# Patient Record
Sex: FEMALE | URBAN - METROPOLITAN AREA
[De-identification: names, ages, dates, MRNs, and addresses within clinical notes are randomized per-mention and may not be internally consistent; named-entity substitution may affect disease eponyms.]

---

## 2017-02-13 ENCOUNTER — IMPORTED ENCOUNTER (OUTPATIENT)
Dept: URBAN - METROPOLITAN AREA CLINIC 1 | Facility: CLINIC | Age: 67
End: 2017-02-13

## 2017-02-13 PROBLEM — H40.023: Noted: 2017-02-13

## 2017-02-13 PROBLEM — H25.813: Noted: 2017-02-13

## 2017-02-13 PROBLEM — H10.45: Noted: 2017-02-13

## 2017-02-13 PROBLEM — H40.053: Noted: 2017-02-13

## 2017-02-13 PROCEDURE — 92012 INTRM OPH EXAM EST PATIENT: CPT

## 2017-02-13 NOTE — PATIENT DISCUSSION
1.  Allergic Conjunctivitis OU : Begin Zaditor BID OU. Begin Lotemax BID OU. (samples given) D/c Visine allergy. D/c Tobramycin gtts. D/c Neomycin gtts. Begin PF ATs TID OU routinely (Sample of PF Optive given)  2. Glaucoma Suspect/ OHTN OU : (CD 0.75/0.7) Steroid Responder. IOP is elevated today. Begin Combigan BID OU (Sample given) Patient is considered high risk. Compliance with f/u's discussed with patient today. Risk of permanent blindness discussed. 3.  Cataract OU: Observe for now without intervention. The patient was advised to contact us if any change or worsening of visionReturn for an appointment in 2 week 10 OCT with Dr. Kary Vazquez.

## 2017-02-27 ENCOUNTER — IMPORTED ENCOUNTER (OUTPATIENT)
Dept: URBAN - METROPOLITAN AREA CLINIC 1 | Facility: CLINIC | Age: 67
End: 2017-02-27

## 2017-02-27 PROBLEM — H40.023: Noted: 2017-02-27

## 2017-02-27 PROBLEM — H16.143: Noted: 2017-02-27

## 2017-02-27 PROBLEM — H04.123: Noted: 2017-02-27

## 2017-02-27 PROBLEM — H10.45: Noted: 2017-02-27

## 2017-02-27 PROBLEM — H25.813: Noted: 2017-02-27

## 2017-02-27 PROBLEM — H40.053: Noted: 2017-02-27

## 2017-02-27 PROCEDURE — 92133 CPTRZD OPH DX IMG PST SGM ON: CPT

## 2017-02-27 PROCEDURE — 92012 INTRM OPH EXAM EST PATIENT: CPT

## 2017-02-27 NOTE — PATIENT DISCUSSION
1.  Allergic Conjunctivitis OU- Improving. Recommend using Lotemax OU QD until gone. Pt to continue Zaditor OU BID. The condition was  discussed with the patient. Avoidance of allergens and cool compresses were recommended. 2. SHERON w/ decreased PEK OU- Improving. The continuation of artificial tears were recommended. 3.  Cataract OU: Observe for now without intervention. The patient was advised to contact us if any change or worsening of vision4. Glaucoma Suspect/OHTN OU (CD 0.75/0.7) Steroid Responder. IOP improved on Combigan. Normal OCT OU. Continue Combigan OU BID. Observe for changes/progression. 5.  Return for an appointment for a 30 in 6 months with Dr. Neha Silveira.

## 2017-06-19 ENCOUNTER — IMPORTED ENCOUNTER (OUTPATIENT)
Dept: URBAN - METROPOLITAN AREA CLINIC 1 | Facility: CLINIC | Age: 67
End: 2017-06-19

## 2017-06-19 PROBLEM — H10.45: Noted: 2017-06-19

## 2017-06-19 PROBLEM — H16.143: Noted: 2017-06-19

## 2017-06-19 PROBLEM — H04.123: Noted: 2017-06-19

## 2017-06-19 PROBLEM — H25.813: Noted: 2017-06-19

## 2017-06-19 PROBLEM — H40.023: Noted: 2017-06-19

## 2017-06-19 PROCEDURE — 92012 INTRM OPH EXAM EST PATIENT: CPT

## 2017-06-19 NOTE — PATIENT DISCUSSION
1.  Glaucoma Suspect/OHTN OU (CD 0.75/0.7) Steroid Responder. H/o Combigan allergy. IOP stable on Latanoprost. Cont Latanoprost QHS OU. 2.  Cataract OU:  Observe. Consider Phaco next visit. 3.  SHERON w/ PEK OU- Cont ATs TID OU Routinely. 4.  Allergic Conjunctivitis OU - Use Zaditor BID OU PRN. Return for an appointment in Jan 30 glare (Consider Phaco) with Dr. Maira Borges.

## 2018-01-22 ENCOUNTER — IMPORTED ENCOUNTER (OUTPATIENT)
Dept: URBAN - METROPOLITAN AREA CLINIC 1 | Facility: CLINIC | Age: 68
End: 2018-01-22

## 2018-01-22 PROBLEM — H16.143: Noted: 2018-01-22

## 2018-01-22 PROBLEM — H25.813: Noted: 2018-01-22

## 2018-01-22 PROBLEM — H40.053: Noted: 2018-01-22

## 2018-01-22 PROBLEM — H04.123: Noted: 2018-01-22

## 2018-01-22 PROCEDURE — 92014 COMPRE OPH EXAM EST PT 1/>: CPT

## 2018-01-22 NOTE — PATIENT DISCUSSION
1.  OHTN OU- (.8 OD/.75 OS) IOP stable on Latanoprost OU QHS. (Steroid Responder. H/o Combigan allergy.) 2. SHERON w/ PEK OU-The continuation of artificial tears were recommended. 3.  Cataract OU:  Visually Significant secondary to glare discussed the risks benefits alternatives and limitations of cataract surgery. The patient stated a full understanding and a desire to proceed with the procedure. The patient will need to return for preop appointment with cataract measurements and to have any additional questions answered and start pre-operative eye drops as directed. Phaco PCL OD then OS ( consider goal of mid range patient is a )Otherwise follow-up 6mo/10 OCT4. Return for an appointment for Ascan/HP with Dr. Alex Cerda.

## 2018-02-26 ENCOUNTER — IMPORTED ENCOUNTER (OUTPATIENT)
Dept: URBAN - METROPOLITAN AREA CLINIC 1 | Facility: CLINIC | Age: 68
End: 2018-02-26

## 2018-02-26 PROBLEM — H25.811: Noted: 2018-02-26

## 2018-02-26 PROCEDURE — 92136 OPHTHALMIC BIOMETRY: CPT

## 2018-02-26 NOTE — PATIENT DISCUSSION
Cataract OD: Visually Significant secondary to glare discussed the risks benefits alternatives and limitations of cataract surgery. The patient stated a full understanding and a desire to proceed with the procedure. The patient will need to start pre-operative eye drops as directed. **Pt prefers mid-range to distanceProceed w/ phaco PCL ODPt understands they will need glasses post-op to achieve their best corrected vision. Return for an appointment for sx OD with Dr. Jose Alfred.

## 2018-03-14 ENCOUNTER — IMPORTED ENCOUNTER (OUTPATIENT)
Dept: URBAN - METROPOLITAN AREA CLINIC 1 | Facility: CLINIC | Age: 68
End: 2018-03-14

## 2018-03-15 ENCOUNTER — IMPORTED ENCOUNTER (OUTPATIENT)
Dept: URBAN - METROPOLITAN AREA CLINIC 1 | Facility: CLINIC | Age: 68
End: 2018-03-15

## 2018-03-15 PROBLEM — Z96.1: Noted: 2018-03-15

## 2018-03-15 PROCEDURE — 99024 POSTOP FOLLOW-UP VISIT: CPT

## 2018-03-15 NOTE — PATIENT DISCUSSION
POD#1 CE/IOL  Standard OD doing well. Continue all 3 gtts as prescribed and until gone. Ocuflox TIDLotemax BIDProlensa QDPost op Warnings Reiterated RTC as scheduled

## 2018-03-21 ENCOUNTER — IMPORTED ENCOUNTER (OUTPATIENT)
Dept: URBAN - METROPOLITAN AREA CLINIC 1 | Facility: CLINIC | Age: 68
End: 2018-03-21

## 2018-03-21 PROBLEM — H25.812: Noted: 2018-03-21

## 2018-03-21 PROCEDURE — 92136 OPHTHALMIC BIOMETRY: CPT

## 2018-03-28 ENCOUNTER — IMPORTED ENCOUNTER (OUTPATIENT)
Dept: URBAN - METROPOLITAN AREA CLINIC 1 | Facility: CLINIC | Age: 68
End: 2018-03-28

## 2018-03-29 ENCOUNTER — IMPORTED ENCOUNTER (OUTPATIENT)
Dept: URBAN - METROPOLITAN AREA CLINIC 1 | Facility: CLINIC | Age: 68
End: 2018-03-29

## 2018-03-29 PROBLEM — Z96.1: Noted: 2018-03-29

## 2018-03-29 PROCEDURE — 99024 POSTOP FOLLOW-UP VISIT: CPT

## 2018-03-29 NOTE — PATIENT DISCUSSION
1. POD#1 CE/IOL OS (Standard) doing well. Continue all 3 gtts as prescribed and until gone. Use Lotemax BID OS Prolensa Qdaily OS Ocuflox TID OS Use Latanoprost QHS OU1 gtt Combigan applied OS 2. POW#2  CE/IOL OD (Standard) doing well.    Use Lotemax BID OD till out Use Prolensa Qdaily OD till out F/u as scheduled

## 2018-04-23 ENCOUNTER — IMPORTED ENCOUNTER (OUTPATIENT)
Dept: URBAN - METROPOLITAN AREA CLINIC 1 | Facility: CLINIC | Age: 68
End: 2018-04-23

## 2018-04-23 PROBLEM — Z96.1: Noted: 2018-04-23

## 2018-04-23 PROCEDURE — 99024 POSTOP FOLLOW-UP VISIT: CPT

## 2018-04-23 NOTE — PATIENT DISCUSSION
POW#3 Phaco/ PCL OU (Standard OU) doing well. Finish PO meds per schedule MRX for glasses given Cont Latanoprost QHS OU. Return for an appointment in 6 mo 30 OCT with Dr. Amilcar He.

## 2019-02-11 ENCOUNTER — IMPORTED ENCOUNTER (OUTPATIENT)
Dept: URBAN - METROPOLITAN AREA CLINIC 1 | Facility: CLINIC | Age: 69
End: 2019-02-11

## 2019-02-11 PROBLEM — H04.123: Noted: 2019-02-11

## 2019-02-11 PROBLEM — H10.45: Noted: 2019-02-11

## 2019-02-11 PROBLEM — H40.053: Noted: 2019-02-11

## 2019-02-11 PROBLEM — Z96.1: Noted: 2019-02-11

## 2019-02-11 PROBLEM — H40.023: Noted: 2019-02-11

## 2019-02-11 PROBLEM — H26.493: Noted: 2019-02-11

## 2019-02-11 PROBLEM — H16.143: Noted: 2019-02-11

## 2019-02-11 PROCEDURE — 92133 CPTRZD OPH DX IMG PST SGM ON: CPT

## 2019-02-11 PROCEDURE — 92014 COMPRE OPH EXAM EST PT 1/>: CPT

## 2019-02-11 NOTE — PATIENT DISCUSSION
1.  Glaucoma Suspect OU/OHTN (CD 0.80/0.75) : Steroid Responder. Slight relative progression but OCT remains WNL OU. Cont Latanoprost QHS OU (erx). Patient is considered high risk. Condition was discussed with patient and patient understands. Will continue to monitor patient for any progression in condition. Patient was advised to call us with any problems questions or concerns. 2.  SHERON w/ PEK OU-The use/continuation of artificial tears were recommended. 3.  PCO OU: (Posterior Capsule Opacification)   Observe and consider yag cap when pt feels pco visually significant and visual acuity decreases to appropriate level. 4. Pseudophakia OU - (Standard OU) 5. Allergic Conjunctivitis OU :  Condition discussed with patient. Advised patient to use OTC Zaditor one drop OU BID prn. Return for an appointment in 6 months 10 with Dr. Cheo Smiley.

## 2019-08-26 ENCOUNTER — IMPORTED ENCOUNTER (OUTPATIENT)
Dept: URBAN - METROPOLITAN AREA CLINIC 1 | Facility: CLINIC | Age: 69
End: 2019-08-26

## 2019-08-26 PROBLEM — H26.493: Noted: 2019-08-26

## 2019-08-26 PROBLEM — H16.143: Noted: 2019-08-26

## 2019-08-26 PROBLEM — H40.053: Noted: 2019-08-26

## 2019-08-26 PROBLEM — H40.023: Noted: 2019-08-26

## 2019-08-26 PROBLEM — Z96.1: Noted: 2019-08-26

## 2019-08-26 PROBLEM — H10.45: Noted: 2019-08-26

## 2019-08-26 PROBLEM — H04.123: Noted: 2019-08-26

## 2019-08-26 PROCEDURE — 99213 OFFICE O/P EST LOW 20 MIN: CPT

## 2019-08-26 NOTE — PATIENT DISCUSSION
1.  Glaucoma Suspect OU/OHTN (CD 0.80/0.75): IOP stable cont Latanoprost QHS OU. Steroid Responder. Patient is considered high risk. 2.  SHERON w/ PEK OU- Recommend ATs TID OU routinely 3. PCO OU: (Posterior Capsule Opacification)   Observe and consider yag cap when pt feels pco visually significant and visual acuity decreases to appropriate level. 4. Pseudophakia OU - (Standard OU) 5. Allergic Conjunctivitis OU -- The condition was  discussed with the patient. Avoidance of allergens and cool compresses were recommended. Return for an appointment in 6 months 30/glare with Dr. Mami Lassiter.

## 2020-09-21 ENCOUNTER — IMPORTED ENCOUNTER (OUTPATIENT)
Dept: URBAN - METROPOLITAN AREA CLINIC 1 | Facility: CLINIC | Age: 70
End: 2020-09-21

## 2020-09-21 PROBLEM — H16.143: Noted: 2020-09-21

## 2020-09-21 PROBLEM — H40.023: Noted: 2020-09-21

## 2020-09-21 PROBLEM — H26.493: Noted: 2020-09-21

## 2020-09-21 PROBLEM — H04.123: Noted: 2020-09-21

## 2020-09-21 PROBLEM — H40.053: Noted: 2020-09-21

## 2020-09-21 PROCEDURE — 92133 CPTRZD OPH DX IMG PST SGM ON: CPT

## 2020-09-21 PROCEDURE — 92014 COMPRE OPH EXAM EST PT 1/>: CPT

## 2020-09-21 PROCEDURE — 92015 DETERMINE REFRACTIVE STATE: CPT

## 2020-09-21 NOTE — PATIENT DISCUSSION
1.  Glaucoma Suspect OU/OHTN -- (CD 0.80/0.75) IOP elevated today secondary to non-compliance (pt off gtts x 4 days). OCT today showing relative decrease RNFL OU. Restart Latanoprost QHS OU. Steroid Responder. Patient is considered high risk. 2. PCO OU -- Visually Significant *secondary to glare*; schedule YAG Cap OD then OS. Pros cons risks and benefits. 3.  SHERON w/ PEK OU -- Increase ATs QID OU routinely. 4.  Pseudophakia OU -- (Standard OU). 5.  Allergic Conjunctivitis OU -- The condition was discussed with the patient. Avoidance of allergens and cool compresses were recommended. Return for an appointment in YAG Cap OD then OS ([facility]) with Dr. Jerzy Eagle.

## 2020-09-30 ENCOUNTER — IMPORTED ENCOUNTER (OUTPATIENT)
Dept: URBAN - METROPOLITAN AREA CLINIC 1 | Facility: CLINIC | Age: 70
End: 2020-09-30

## 2020-10-14 ENCOUNTER — IMPORTED ENCOUNTER (OUTPATIENT)
Dept: URBAN - METROPOLITAN AREA CLINIC 1 | Facility: CLINIC | Age: 70
End: 2020-10-14

## 2020-11-05 ENCOUNTER — IMPORTED ENCOUNTER (OUTPATIENT)
Dept: URBAN - METROPOLITAN AREA CLINIC 1 | Facility: CLINIC | Age: 70
End: 2020-11-05

## 2020-11-05 PROCEDURE — 99024 POSTOP FOLLOW-UP VISIT: CPT

## 2020-11-05 NOTE — PATIENT DISCUSSION
PO YAG Cap OU: good result. MRX given. Continue Latanoprost QHS OUReturn for an appointment in April 10 with Dr. Amilcar He.

## 2021-06-10 ENCOUNTER — IMPORTED ENCOUNTER (OUTPATIENT)
Dept: URBAN - METROPOLITAN AREA CLINIC 1 | Facility: CLINIC | Age: 71
End: 2021-06-10

## 2021-06-10 PROBLEM — H40.023: Noted: 2021-06-10

## 2021-06-10 PROBLEM — H40.053: Noted: 2021-06-10

## 2021-06-10 PROCEDURE — 92012 INTRM OPH EXAM EST PATIENT: CPT

## 2021-06-10 NOTE — PATIENT DISCUSSION
1.  Glaucoma Suspect OU : IOP stable continue Latanoprost QHS OU. Steroid Responder. Patient is considered high risk. Condition was discussed with patient and patient understands. Will continue to monitor patient for any progression in condition. Patient was advised to call us with any problems questions or concerns. 2.  SHERON w/ PEK OU - Recommend increase ATs QID OU routinely. 3.  Pseudophakia OU - (Standard OU). 4.  Allergic Conjunctivitis OU - The condition was discussed with the patient. Avoidance of allergens and cool compresses were recommended. Return for an appointment in 6 months 30/OCT with Dr. Wilman Adam.

## 2021-06-10 NOTE — PATIENT DISCUSSION
Latanoprost QHS OU. Steroid Responder. Patient is considered high risk. 2. PCO OU -- Visually Significant *secondary to glare*; schedule YAG Cap OD then OS. Pros cons risks and benefits. 3.  SHERON w/ PEK OU -- Increase ATs QID OU routinely. 4.  Pseudophakia OU -- (Standard OU). 5.  Allergic Conjunctivitis OU -- The condition was discussed with the patient. Avoidance of allergens and cool compresses were recommended.

## 2021-12-13 ENCOUNTER — IMPORTED ENCOUNTER (OUTPATIENT)
Dept: URBAN - METROPOLITAN AREA CLINIC 1 | Facility: CLINIC | Age: 71
End: 2021-12-13

## 2021-12-13 PROBLEM — H40.023: Noted: 2021-12-13

## 2021-12-13 PROCEDURE — 92133 CPTRZD OPH DX IMG PST SGM ON: CPT

## 2021-12-13 PROCEDURE — 99214 OFFICE O/P EST MOD 30 MIN: CPT

## 2021-12-13 PROCEDURE — 92015 DETERMINE REFRACTIVE STATE: CPT

## 2021-12-13 NOTE — PATIENT DISCUSSION
1.  Glaucoma Suspect OU/OHTN -- () Steroid Responder. T-MAX: 30/28. IOP stable on Latanoprost. OCT today remains without progression. Continue Latanoprost QHS OU. 2.  SHERON w/ PEK OU -- Recommend continue ATs QID OU routinely. 3.  Pseudophakia OU -- (Standard OU). H/o YAG Cap OU. 4.  Allergic Conjunctivitis OU -- The condition was discussed with the patient. Avoidance of allergens and cool compresses were recommended. Finalized glasses MRx today. Return for an appointment in 6 months 10 with Dr. Wilman Adam.

## 2022-04-02 ASSESSMENT — TONOMETRY
OS_IOP_MMHG: 16
OD_IOP_MMHG: 30
OS_IOP_MMHG: 17
OS_IOP_MMHG: 22
OS_IOP_MMHG: 17
OD_IOP_MMHG: 21
OD_IOP_MMHG: 14
OS_IOP_MMHG: 24
OD_IOP_MMHG: 17
OD_IOP_MMHG: 18
OS_IOP_MMHG: 17
OD_IOP_MMHG: 19
OD_IOP_MMHG: 16
OS_IOP_MMHG: 16
OD_IOP_MMHG: 16
OS_IOP_MMHG: 15
OD_IOP_MMHG: 16
OS_IOP_MMHG: 16
OD_IOP_MMHG: 13
OS_IOP_MMHG: 28
OS_IOP_MMHG: 18
OS_IOP_MMHG: 13
OD_IOP_MMHG: 16
OD_IOP_MMHG: 16
OD_IOP_MMHG: 18
OS_IOP_MMHG: 14
OD_IOP_MMHG: 15

## 2022-04-02 ASSESSMENT — VISUAL ACUITY
OD_GLARE: 20/400
OD_SC: 20/25
OD_SC: 20/20
OD_CC: 20/30
OD_CC: 20/20-2
OS_CC: 20/50
OS_CC: 20/50
OD_CC: 20/20-2
OS_SC: J1
OD_CC: 20/30
OS_CC: 20/40
OD_SC: 20/25+2
OD_CC: 20/20
OD_GLARE: 20/25
OD_CC: 20/25
OD_CC: 20/25
OD_CC: 20/20-1
OS_SC: 20/20
OD_SC: J1
OD_CC: 20/25
OD_CC: 20/40
OD_GLARE: 20/80
OD_CC: J1
OS_SC: 20/25+2
OS_SC: 20/20
OD_CC: 20/20-2
OS_GLARE: 20/80
OS_SC: 20/25
OS_CC: 20/40
OS_GLARE: 20/400
OS_CC: 20/40
OS_CC: 20/30+1
OS_CC: J1
OS_CC: 20/40
OS_CC: 20/40
OS_CC: 20/25
OD_SC: 20/20

## 2022-06-13 ENCOUNTER — FOLLOW UP (OUTPATIENT)
Dept: URBAN - METROPOLITAN AREA CLINIC 1 | Facility: CLINIC | Age: 72
End: 2022-06-13

## 2022-06-13 DIAGNOSIS — H10.45: ICD-10-CM

## 2022-06-13 DIAGNOSIS — H40.023: ICD-10-CM

## 2022-06-13 DIAGNOSIS — Z96.1: ICD-10-CM

## 2022-06-13 DIAGNOSIS — Z98.890: ICD-10-CM

## 2022-06-13 DIAGNOSIS — H04.123: ICD-10-CM

## 2022-06-13 PROCEDURE — 92012 INTRM OPH EXAM EST PATIENT: CPT

## 2022-06-13 ASSESSMENT — TONOMETRY
OD_IOP_MMHG: 16
OS_IOP_MMHG: 16

## 2022-06-13 ASSESSMENT — VISUAL ACUITY
OS_CC: 20/20
OD_CC: 20/20

## 2022-06-13 NOTE — PATIENT DISCUSSION
(+) Steroid Responder. T-MAX: 30/28. IOP stable on Latanoprost. OCT today remains without progression. Continue Latanoprost QHS OU.

## 2022-12-15 ENCOUNTER — COMPREHENSIVE EXAM (OUTPATIENT)
Dept: URBAN - METROPOLITAN AREA CLINIC 1 | Facility: CLINIC | Age: 72
End: 2022-12-15

## 2022-12-15 PROCEDURE — 92133 CPTRZD OPH DX IMG PST SGM ON: CPT

## 2022-12-15 PROCEDURE — 99214 OFFICE O/P EST MOD 30 MIN: CPT

## 2022-12-15 ASSESSMENT — VISUAL ACUITY
OD_SC: J3
OD_SC: 20/25-1
OU_SC: J1
OS_SC: J2
OS_CC: 20/20
OS_SC: 20/30-1
OD_CC: 20/20

## 2022-12-15 ASSESSMENT — TONOMETRY
OD_IOP_MMHG: 18
OS_IOP_MMHG: 18

## 2022-12-15 NOTE — PATIENT DISCUSSION
(CD 0.80/0.75) OCT remains WNL OU, stable. IOP stable. Continue Latanoprost QHS OU. (+) Steroid Responder.

## 2023-04-24 ENCOUNTER — EMERGENCY VISIT (OUTPATIENT)
Dept: URBAN - METROPOLITAN AREA CLINIC 1 | Facility: CLINIC | Age: 73
End: 2023-04-24

## 2023-04-24 DIAGNOSIS — H43.813: ICD-10-CM

## 2023-04-24 PROCEDURE — 99213 OFFICE O/P EST LOW 20 MIN: CPT

## 2023-04-24 ASSESSMENT — VISUAL ACUITY
OS_CC: 20/20
OD_CC: 20/20-1
OS_CC: J1+
OD_CC: J1+

## 2023-04-24 ASSESSMENT — TONOMETRY
OS_IOP_MMHG: 16
OD_IOP_MMHG: 14

## 2023-06-19 ENCOUNTER — FOLLOW UP (OUTPATIENT)
Dept: URBAN - METROPOLITAN AREA CLINIC 1 | Facility: CLINIC | Age: 73
End: 2023-06-19

## 2023-06-19 DIAGNOSIS — H43.812: ICD-10-CM

## 2023-06-19 PROCEDURE — 99213 OFFICE O/P EST LOW 20 MIN: CPT

## 2023-06-19 ASSESSMENT — VISUAL ACUITY
OD_SC: 20/25
OS_SC: 20/30-2

## 2023-06-19 ASSESSMENT — TONOMETRY
OD_IOP_MMHG: 16
OS_IOP_MMHG: 16

## 2024-01-15 ENCOUNTER — COMPREHENSIVE EXAM (OUTPATIENT)
Dept: URBAN - METROPOLITAN AREA CLINIC 1 | Facility: CLINIC | Age: 74
End: 2024-01-15

## 2024-01-15 DIAGNOSIS — H40.023: ICD-10-CM

## 2024-01-15 DIAGNOSIS — H10.45: ICD-10-CM

## 2024-01-15 DIAGNOSIS — H04.123: ICD-10-CM

## 2024-01-15 DIAGNOSIS — H43.812: ICD-10-CM

## 2024-01-15 PROCEDURE — 99214 OFFICE O/P EST MOD 30 MIN: CPT

## 2024-01-15 PROCEDURE — 92133 CPTRZD OPH DX IMG PST SGM ON: CPT

## 2024-01-15 ASSESSMENT — TONOMETRY
OD_IOP_MMHG: 13
OS_IOP_MMHG: 14

## 2024-01-15 ASSESSMENT — VISUAL ACUITY
OS_CC: 20/25-1
OD_CC: J2
OD_CC: 20/20-1
OS_CC: J2